# Patient Record
Sex: MALE | Race: BLACK OR AFRICAN AMERICAN | NOT HISPANIC OR LATINO
[De-identification: names, ages, dates, MRNs, and addresses within clinical notes are randomized per-mention and may not be internally consistent; named-entity substitution may affect disease eponyms.]

---

## 2021-04-28 ENCOUNTER — NON-APPOINTMENT (OUTPATIENT)
Age: 31
End: 2021-04-28

## 2021-05-10 ENCOUNTER — NON-APPOINTMENT (OUTPATIENT)
Age: 31
End: 2021-05-10

## 2021-05-10 ENCOUNTER — APPOINTMENT (OUTPATIENT)
Dept: CARDIOLOGY | Facility: CLINIC | Age: 31
End: 2021-05-10
Payer: COMMERCIAL

## 2021-05-10 VITALS
HEIGHT: 74 IN | DIASTOLIC BLOOD PRESSURE: 78 MMHG | WEIGHT: 315 LBS | BODY MASS INDEX: 40.43 KG/M2 | SYSTOLIC BLOOD PRESSURE: 128 MMHG

## 2021-05-10 PROBLEM — Z00.00 ENCOUNTER FOR PREVENTIVE HEALTH EXAMINATION: Status: ACTIVE | Noted: 2021-05-10

## 2021-05-10 PROCEDURE — 99072 ADDL SUPL MATRL&STAF TM PHE: CPT

## 2021-05-10 PROCEDURE — 93000 ELECTROCARDIOGRAM COMPLETE: CPT

## 2021-05-10 PROCEDURE — 99204 OFFICE O/P NEW MOD 45 MIN: CPT | Mod: 25

## 2021-05-10 NOTE — PHYSICAL EXAM

## 2021-05-10 NOTE — HISTORY OF PRESENT ILLNESS
[FreeTextEntry1] : Mr. TREMAINE LEWIS  is a 30 year year old M with HTN who has been on medication since 19 who presents with complaints of  intermittent shortness of breath on exertion. \par He has ganglion cysts in left foot and is on a boot. 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 \par \par Pt denies symptoms of chest pain, lightheadedness, dizziness, syncope, claudication, orthopnea, PND, or edema. \par Patient denies past medical history of diabetes, hyperlipidemia,\par Patient denies history of MI, stroke or TIA, diabetes, peripheral vascular disease, aortic aneurysm or renal impairment. \par Resting EKG revealed sinus tachycardia. \par \par Pt has tried lisinopril and hTZ and previously had hematuria. \par He was on bystolic however due to necessity of preauth pt was changed to valsartan however has not taken medication\par he is currently on amlodipine 10 mg and has no edema. \par \par \par

## 2021-05-10 NOTE — ASSESSMENT
[FreeTextEntry1] : EKG : May 2021 \par Sinus tachycardia. \par \par check 2d echo for LVH\par \par HTN - controlled in morning \par uncontrol in evening\par start HCTZ 12.5 mg. \par check CMP and urinalysis for hematuria\par check lipid panel\par \par f/u 2 weeks with BP diary\par \par discussed low salt diet. \par \par if above does not work - may restart Bystolic. \par \par \par present with wife \par time 60min. \par \par

## 2021-05-18 ENCOUNTER — RESULT REVIEW (OUTPATIENT)
Age: 31
End: 2021-05-18

## 2021-05-24 ENCOUNTER — APPOINTMENT (OUTPATIENT)
Dept: CARDIOLOGY | Facility: CLINIC | Age: 31
End: 2021-05-24
Payer: COMMERCIAL

## 2021-05-24 VITALS
WEIGHT: 315 LBS | TEMPERATURE: 98.7 F | SYSTOLIC BLOOD PRESSURE: 126 MMHG | DIASTOLIC BLOOD PRESSURE: 72 MMHG | BODY MASS INDEX: 57.97 KG/M2 | HEIGHT: 62 IN

## 2021-05-24 DIAGNOSIS — R06.02 SHORTNESS OF BREATH: ICD-10-CM

## 2021-05-24 PROCEDURE — 99072 ADDL SUPL MATRL&STAF TM PHE: CPT

## 2021-05-24 PROCEDURE — 99213 OFFICE O/P EST LOW 20 MIN: CPT

## 2021-05-24 NOTE — HISTORY OF PRESENT ILLNESS
[FreeTextEntry1] : Mr. TREMAINE LEWIS  is a 30 year year old M with HTN who has been on medication since 19 who presents with complaints of  intermittent shortness of breath on exertion. \par He has ganglion cysts in left foot and is on a boot. 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 \par \par Pt denies symptoms of chest pain, lightheadedness, dizziness, syncope, claudication, orthopnea, PND, or edema. \par Patient denies past medical history of diabetes, hyperlipidemia,\par Patient denies history of MI, stroke or TIA, diabetes, peripheral vascular disease, aortic aneurysm or renal impairment. \par Resting EKG revealed sinus tachycardia. \par \par Pt has tried lisinopril and hTZ and previously had hematuria. \par He was on bystolic however due to necessity of preauth pt was changed to valsartan however has not taken medication\par he is currently on amlodipine 10 mg and has no edema. \par \par Since last visit - BP is controlled on amlodipine 10 mg and HCTZ 12.5 mg. 2d echo with mild LVH 1.1 cm. \par \par \par

## 2021-05-24 NOTE — ASSESSMENT
[FreeTextEntry1] : EKG : May 2021 \par Sinus tachycardia. \par \par 2d echo -LVH May 2021 - 1.1 cm \par \par HTN - controlled \par amlodipine 10 mg\par HCTZ 12.5 mg. \par \par discussed low salt diet. \par \par if above does not work - may restart Bystolic. \par \par \par present with wife \par  jacob 20 min. \par \par f/u 3 months. \par \par \par

## 2021-09-02 ENCOUNTER — APPOINTMENT (OUTPATIENT)
Dept: CARDIOLOGY | Facility: CLINIC | Age: 31
End: 2021-09-02

## 2021-12-14 ENCOUNTER — RX CHANGE (OUTPATIENT)
Age: 31
End: 2021-12-14

## 2022-01-21 ENCOUNTER — APPOINTMENT (OUTPATIENT)
Dept: CARDIOLOGY | Facility: CLINIC | Age: 32
End: 2022-01-21
Payer: COMMERCIAL

## 2022-01-21 ENCOUNTER — NON-APPOINTMENT (OUTPATIENT)
Age: 32
End: 2022-01-21

## 2022-01-21 VITALS
SYSTOLIC BLOOD PRESSURE: 138 MMHG | HEART RATE: 103 BPM | BODY MASS INDEX: 57.97 KG/M2 | OXYGEN SATURATION: 98 % | HEIGHT: 62 IN | WEIGHT: 315 LBS | DIASTOLIC BLOOD PRESSURE: 98 MMHG

## 2022-01-21 DIAGNOSIS — I10 ESSENTIAL (PRIMARY) HYPERTENSION: ICD-10-CM

## 2022-01-21 PROCEDURE — 93000 ELECTROCARDIOGRAM COMPLETE: CPT

## 2022-01-21 PROCEDURE — 99213 OFFICE O/P EST LOW 20 MIN: CPT

## 2022-01-21 RX ORDER — AMLODIPINE BESYLATE 10 MG/1
10 TABLET ORAL DAILY
Qty: 90 | Refills: 1 | Status: ACTIVE | COMMUNITY
Start: 1900-01-01 | End: 1900-01-01

## 2022-01-21 NOTE — ASSESSMENT
[FreeTextEntry1] : EKG : May 2021 \par Sinus tachycardia. \par \par 2d echo -LVH May 2021 - 1.1 cm \par \par HTN - controlled \par amlodipine 10 mg\par HCTZ 12.5 mg. \par \par discussed low salt diet. \par \par \par \par present with wife \par \par Jan 2022\par BP uncontrolled\par refill amlodipein\par increase hctz to 25 mg. \par if above does not work - may restart Bystolic. \par time 25 min. \par \par f/u 3 months. \par \par \par

## 2022-01-21 NOTE — HISTORY OF PRESENT ILLNESS
[FreeTextEntry1] : Mr. TREMAINE LEWIS  is a 31 year year old M with HTN who has been on medication since 19 who presents with complaints of  intermittent shortness of breath on exertion. \par He has ganglion cysts in left foot and is on a boot. 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 \par \par Pt denies symptoms of chest pain, lightheadedness, dizziness, syncope, claudication, orthopnea, PND, or edema. \par Patient denies past medical history of diabetes, hyperlipidemia,\par Patient denies history of MI, stroke or TIA, diabetes, peripheral vascular disease, aortic aneurysm or renal impairment. \par Resting EKG revealed sinus tachycardia. \par \par Pt has tried lisinopril and hTZ and previously had hematuria. \par He was on bystolic however due to necessity of preauth pt was changed to valsartan however has not taken medication\par he is currently on amlodipine 10 mg and has no edema. \par \par BP is controlled on amlodipine 10 mg and HCTZ 12.5 mg. 2d echo with mild LVH 1.1 cm. \par \par Since last visit - bp remains uncontrolled. \par \par

## 2022-02-02 ENCOUNTER — NON-APPOINTMENT (OUTPATIENT)
Age: 32
End: 2022-02-02

## 2022-02-22 ENCOUNTER — TRANSCRIPTION ENCOUNTER (OUTPATIENT)
Age: 32
End: 2022-02-22

## 2022-03-08 ENCOUNTER — APPOINTMENT (OUTPATIENT)
Dept: CARDIOLOGY | Facility: CLINIC | Age: 32
End: 2022-03-08

## 2022-03-08 ENCOUNTER — APPOINTMENT (OUTPATIENT)
Dept: HEART AND VASCULAR | Facility: CLINIC | Age: 32
End: 2022-03-08

## 2022-04-15 ENCOUNTER — APPOINTMENT (OUTPATIENT)
Dept: GASTROENTEROLOGY | Facility: CLINIC | Age: 32
End: 2022-04-15
Payer: COMMERCIAL

## 2022-04-15 VITALS
HEART RATE: 89 BPM | BODY MASS INDEX: 41.75 KG/M2 | WEIGHT: 315 LBS | DIASTOLIC BLOOD PRESSURE: 96 MMHG | HEIGHT: 73 IN | TEMPERATURE: 96.7 F | OXYGEN SATURATION: 98 % | SYSTOLIC BLOOD PRESSURE: 132 MMHG

## 2022-04-15 DIAGNOSIS — E73.9 LACTOSE INTOLERANCE, UNSPECIFIED: ICD-10-CM

## 2022-04-15 PROCEDURE — 99203 OFFICE O/P NEW LOW 30 MIN: CPT

## 2022-04-15 NOTE — ASSESSMENT
[FreeTextEntry1] : Lactose intolerance after completing a long course of abx.  Likely in part due to a change in his gut darien.\par \par Advised on lactose avoidance. He will use Lactaid if he consumes dairy.\par \par Will give a trial of a probiotic once daily such as Digestive Advantage in order to augment and potentially normalize bacterial darien.\par

## 2022-04-15 NOTE — HISTORY OF PRESENT ILLNESS
[FreeTextEntry1] : Mr. 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 During this time he has developed a lactose intolerance. He used to be able to eat dairy without difficulty, now he becomes very gassy and bloated after eating dairy, has noticed this the most over the past 2 weeks. No diarrhea, constipation, black stool, rectal bleeding.\par \par Otherwise feels well, no other changes in his health.\par \par Has started a probiotic.\par \par Has not otherwise changed his diet.\par \par Does not smoke or drink.\par \par No FHx of any GI malignancies.

## 2024-01-25 ENCOUNTER — APPOINTMENT (OUTPATIENT)
Dept: GASTROENTEROLOGY | Facility: CLINIC | Age: 34
End: 2024-01-25
Payer: COMMERCIAL

## 2024-01-25 VITALS
DIASTOLIC BLOOD PRESSURE: 80 MMHG | SYSTOLIC BLOOD PRESSURE: 135 MMHG | RESPIRATION RATE: 18 BRPM | HEIGHT: 73 IN | HEART RATE: 93 BPM | OXYGEN SATURATION: 98 % | BODY MASS INDEX: 41.75 KG/M2 | WEIGHT: 315 LBS

## 2024-01-25 DIAGNOSIS — R11.2 NAUSEA WITH VOMITING, UNSPECIFIED: ICD-10-CM

## 2024-01-25 PROCEDURE — 99214 OFFICE O/P EST MOD 30 MIN: CPT

## 2024-01-25 RX ORDER — HYDROCHLOROTHIAZIDE 25 MG/1
25 TABLET ORAL DAILY
Qty: 90 | Refills: 3 | Status: DISCONTINUED | COMMUNITY
Start: 2021-05-11 | End: 2024-01-25

## 2024-01-26 LAB
ALBUMIN SERPL ELPH-MCNC: 4.4 G/DL
ALP BLD-CCNC: 72 U/L
ALT SERPL-CCNC: 49 U/L
ANION GAP SERPL CALC-SCNC: 11 MMOL/L
AST SERPL-CCNC: 21 U/L
BASOPHILS # BLD AUTO: 0.02 K/UL
BASOPHILS NFR BLD AUTO: 0.2 %
BILIRUB SERPL-MCNC: 0.7 MG/DL
BUN SERPL-MCNC: 10 MG/DL
CALCIUM SERPL-MCNC: 9.6 MG/DL
CHLORIDE SERPL-SCNC: 103 MMOL/L
CO2 SERPL-SCNC: 25 MMOL/L
CREAT SERPL-MCNC: 1.01 MG/DL
EGFR: 101 ML/MIN/1.73M2
EOSINOPHIL # BLD AUTO: 0.03 K/UL
EOSINOPHIL NFR BLD AUTO: 0.3 %
ESTIMATED AVERAGE GLUCOSE: 123 MG/DL
GLUCOSE SERPL-MCNC: 112 MG/DL
HBA1C MFR BLD HPLC: 5.9 %
HCT VFR BLD CALC: 40.7 %
HGB BLD-MCNC: 13.3 G/DL
IMM GRANULOCYTES NFR BLD AUTO: 0.2 %
LYMPHOCYTES # BLD AUTO: 3.93 K/UL
LYMPHOCYTES NFR BLD AUTO: 45.8 %
MAN DIFF?: NORMAL
MCHC RBC-ENTMCNC: 28.2 PG
MCHC RBC-ENTMCNC: 32.7 GM/DL
MCV RBC AUTO: 86.4 FL
MONOCYTES # BLD AUTO: 0.59 K/UL
MONOCYTES NFR BLD AUTO: 6.9 %
NEUTROPHILS # BLD AUTO: 4 K/UL
NEUTROPHILS NFR BLD AUTO: 46.6 %
PLATELET # BLD AUTO: 339 K/UL
POTASSIUM SERPL-SCNC: 3.9 MMOL/L
PROT SERPL-MCNC: 7.7 G/DL
RBC # BLD: 4.71 M/UL
RBC # FLD: 16.7 %
SODIUM SERPL-SCNC: 139 MMOL/L
WBC # FLD AUTO: 8.59 K/UL

## 2024-03-04 ENCOUNTER — RESULT REVIEW (OUTPATIENT)
Age: 34
End: 2024-03-04

## 2024-03-05 ENCOUNTER — APPOINTMENT (OUTPATIENT)
Dept: GASTROENTEROLOGY | Facility: HOSPITAL | Age: 34
End: 2024-03-05

## 2024-03-12 DIAGNOSIS — K21.00 GASTRO-ESOPHAGEAL REFLUX DISEASE WITH ESOPHAGITIS, WITHOUT BLEEDING: ICD-10-CM

## 2024-03-12 RX ORDER — FAMOTIDINE 40 MG/1
40 TABLET, FILM COATED ORAL
Qty: 120 | Refills: 2 | Status: ACTIVE | COMMUNITY
Start: 2024-03-12 | End: 1900-01-01

## 2024-03-12 RX ORDER — OMEPRAZOLE 40 MG/1
40 CAPSULE, DELAYED RELEASE ORAL
Qty: 60 | Refills: 3 | Status: DISCONTINUED | COMMUNITY
Start: 2024-01-25 | End: 2024-03-12

## 2025-05-05 ENCOUNTER — APPOINTMENT (OUTPATIENT)
Dept: GASTROENTEROLOGY | Facility: CLINIC | Age: 35
End: 2025-05-05

## 2025-07-24 ENCOUNTER — APPOINTMENT (OUTPATIENT)
Dept: GASTROENTEROLOGY | Facility: CLINIC | Age: 35
End: 2025-07-24